# Patient Record
Sex: MALE | Race: WHITE | NOT HISPANIC OR LATINO | Employment: FULL TIME | URBAN - METROPOLITAN AREA
[De-identification: names, ages, dates, MRNs, and addresses within clinical notes are randomized per-mention and may not be internally consistent; named-entity substitution may affect disease eponyms.]

---

## 2017-10-03 ENCOUNTER — GENERIC CONVERSION - ENCOUNTER (OUTPATIENT)
Dept: OTHER | Facility: OTHER | Age: 47
End: 2017-10-03

## 2017-11-09 ENCOUNTER — GENERIC CONVERSION - ENCOUNTER (OUTPATIENT)
Dept: OTHER | Facility: OTHER | Age: 47
End: 2017-11-09

## 2017-11-09 LAB
A/G RATIO (HISTORICAL): 2.1 (ref 1.2–2.2)
ALBUMIN SERPL BCP-MCNC: 4.5 G/DL (ref 3.5–5.5)
ALP SERPL-CCNC: 51 IU/L (ref 39–117)
ALT SERPL W P-5'-P-CCNC: 19 IU/L (ref 0–44)
AST SERPL W P-5'-P-CCNC: 20 IU/L (ref 0–40)
BILIRUB SERPL-MCNC: 0.6 MG/DL (ref 0–1.2)
BUN SERPL-MCNC: 14 MG/DL (ref 6–24)
BUN/CREA RATIO (HISTORICAL): 13 (ref 9–20)
CALCIUM SERPL-MCNC: 9.2 MG/DL (ref 8.7–10.2)
CHLORIDE SERPL-SCNC: 101 MMOL/L (ref 96–106)
CHOLEST SERPL-MCNC: 181 MG/DL (ref 100–199)
CO2 SERPL-SCNC: 27 MMOL/L (ref 18–29)
CREAT SERPL-MCNC: 1.07 MG/DL (ref 0.76–1.27)
EGFR AFRICAN AMERICAN (HISTORICAL): 96 ML/MIN/1.73
EGFR-AMERICAN CALC (HISTORICAL): 83 ML/MIN/1.73
GLUCOSE SERPL-MCNC: 81 MG/DL (ref 65–99)
HDLC SERPL-MCNC: 46 MG/DL
INTERPRETATION (HISTORICAL): NORMAL
LDLC SERPL CALC-MCNC: 105 MG/DL (ref 0–99)
POTASSIUM SERPL-SCNC: 3.9 MMOL/L (ref 3.5–5.2)
PROSTATE SPECIFIC ANTIGEN (HISTORICAL): 0.9 NG/ML (ref 0–4)
SODIUM SERPL-SCNC: 141 MMOL/L (ref 134–144)
TOT. GLOBULIN, SERUM (HISTORICAL): 2.1 G/DL (ref 1.5–4.5)
TOTAL PROTEIN (HISTORICAL): 6.6 G/DL (ref 6–8.5)
TRIGL SERPL-MCNC: 152 MG/DL (ref 0–149)

## 2017-11-10 ENCOUNTER — GENERIC CONVERSION - ENCOUNTER (OUTPATIENT)
Dept: OTHER | Facility: OTHER | Age: 47
End: 2017-11-10

## 2017-11-20 ENCOUNTER — ALLSCRIPTS OFFICE VISIT (OUTPATIENT)
Dept: OTHER | Facility: OTHER | Age: 47
End: 2017-11-20

## 2018-01-10 NOTE — RESULT NOTES
Verified Results  (1) LIPID PANEL FASTING W DIRECT LDL REFLEX [id number] 07:37AM Ron Proctor     Test Name Result Flag Reference   Cholesterol, Total 181 mg/dL  100-199   Triglycerides 152 mg/dL H 0-149   HDL Cholesterol 46 mg/dL  >39   LDL Cholesterol Calc 105 mg/dL H 0-99

## 2018-01-11 NOTE — RESULT NOTES
Verified Results  (1) COMPREHENSIVE METABOLIC PANEL [id number] 18:80YQ Breezy Luciano     Test Name Result Flag Reference   Glucose, Serum 81 mg/dL  65-99   BUN 14 mg/dL  6-24   Creatinine, Serum 1 07 mg/dL  0 76-1 27   BUN/Creatinine Ratio 13  9-20   Sodium, Serum 141 mmol/L  134-144   Potassium, Serum 3 9 mmol/L  3 5-5 2   Chloride, Serum 101 mmol/L     Carbon Dioxide, Total 27 mmol/L  18-29   Calcium, Serum 9 2 mg/dL  8 7-10 2   Protein, Total, Serum 6 6 g/dL  6 0-8 5   Albumin, Serum 4 5 g/dL  3 5-5 5   Globulin, Total 2 1 g/dL  1 5-4 5   A/G Ratio 2 1  1 2-2 2   Bilirubin, Total 0 6 mg/dL  0 0-1 2   Alkaline Phosphatase, S 51 IU/L     AST (SGOT) 20 IU/L  0-40   ALT (SGPT) 19 IU/L  0-44   eGFR If NonAfricn Am 83 mL/min/1 73  >59   eGFR If Africn Am 96 mL/min/1 73  >59

## 2018-01-12 NOTE — PROGRESS NOTES
Assessment    1  BMI 28 0-28 9,adult (V85 24) (Z68 28)   2  Overweight (278 02) (E66 3)   3  Encounter for preventive health examination (V70 0) (Z00 00)    Discussion/Summary  The patient, patient's family was counseled regarding risk factor reductions, impressions  Chief Complaint  Seen for a CPE  er/cma  History of Present Illness  HM, Adult Male: The patient is being seen for a health maintenance evaluation  General Health: The patient's health since the last visit is described as good  Immunizations status: up to date  Lifestyle:  He consumes a diverse and healthy diet  He does not have any weight concerns  He does not use tobacco    Screening:      Review of Systems    Cardiovascular: no chest pain  Respiratory: no shortness of breath  Active Problems    1  BMI 28 0-28 9,adult (V85 24) (Z68 28)   2  Colon cancer screening (V76 51) (Z12 11)   3  Immunization due (V05 9) (Z23)   4  Overweight (278 02) (E66 3)   5  Prostate cancer screening (V76 44) (Z12 5)   6  Screening for cardiovascular, respiratory, and genitourinary diseases   (V81 2,V81 4,V81 6) (Z13 6,Z13 83,Z13 89)   7  Screening for diabetes mellitus (DM) (V77 1) (Z13 1)   8  Well adult on routine health check (V70 0) (Z00 00)    Past Medical History    · History of Acute maxillary sinusitis (461 0) (J01 00)    Family History  Mother    · Family history of Hypertension  Father    · Family history of Enlarged prostate (600 00) (N40 0)   · Family history of cardiac pacemaker (V17 49) (Z80 80)  Paternal Grandmother    · Family history of Diabetes (250 00) (E11 9)    Social History    · Being A Social Drinker   · Former smoker (A05 18) (Q08 762)    Current Meds   1  [employer] Vitamin C TABS; Take 1 tablet daily Recorded   2  [employer]S Vitamin D TABS; qd Recorded   3  Daily Combo Multi Vitamins Oral Tablet; Take 1 tablet daily Recorded    Allergies    1   Penicillins    Vitals   Recorded: [id number] 10:42AM   Temperature 96 6 F   Heart Rate 76   Respiration 20   Systolic 189   Diastolic 74   Height 5 ft 9 in   Weight 193 lb    BMI Calculated 28 5   BSA Calculated 2 03     Physical Exam    Constitutional   General appearance: No acute distress, well appearing and well nourished  Eyes   Conjunctiva and lids: No erythema, swelling or discharge  Pupils and irises: Equal, round, reactive to light  Ophthalmoscopic examination: Normal fundi and optic discs  Ears, Nose, Mouth, and Throat   External inspection of ears and nose: Normal     Otoscopic examination: Tympanic membranes translucent with normal light reflex  Canals patent without erythema  some wax  Nasal mucosa, septum, and turbinates: Normal without edema or erythema  Lips, teeth, and gums: Normal, good dentition  Oropharynx: Normal with no erythema, edema, exudate or lesions  Neck   Neck: Supple, symmetric, trachea midline, no masses  Pulmonary   Respiratory effort: No increased work of breathing or signs of respiratory distress  Auscultation of lungs: Clear to auscultation  Cardiovascular   Auscultation of heart: Normal rate and rhythm, normal S1 and S2, no murmurs  Carotid pulses: 2+ bilaterally  Pedal pulses: 2+ bilaterally  Examination of extremities for edema and/or varicosities: Normal     Abdomen   Abdomen: Non-tender, no masses  Liver and spleen: No hepatomegaly or splenomegaly  Examination for hernias: No hernias appreciated  Genitourinary   Scrotal contents: Normal testes, no masses  Penis: Normal, no lesions  Digital rectal exam of prostate: Normal size, no masses  Lymphatic   Palpation of lymph nodes in neck: No lymphadenopathy  Palpation of lymph nodes in groin: No lymphadenopathy  Musculoskeletal   Gait and station: Normal     Inspection/palpation of digits and nails: Normal without clubbing or cyanosis      Inspection/palpation of joints, bones, and muscles: Normal     Range of motion: Normal     Stability: Normal  Skin   Skin and subcutaneous tissue: Normal without rashes or lesions  Palpation of skin and subcutaneous tissue: Normal turgor  Neurologic   Reflexes: 2+ and symmetric  Sensation: No sensory loss  Psychiatric   Judgment and insight: Normal     Mood and affect: Normal        Procedure    Procedure: Visual Acuity Test    Indication: routine screening  Inforrmation supplied by a Snellen chart     Results: 20/20 in both eyes without corrective device, 20/30 in the right eye without corrective device, 20/20 in the left eye without corrective device      Signatures   Electronically signed by : Jenn Torrez DO; Nov 20 2017 11:55AM EST                       (Author)

## 2018-01-13 VITALS
WEIGHT: 193 LBS | HEART RATE: 76 BPM | DIASTOLIC BLOOD PRESSURE: 74 MMHG | RESPIRATION RATE: 20 BRPM | HEIGHT: 69 IN | SYSTOLIC BLOOD PRESSURE: 110 MMHG | BODY MASS INDEX: 28.58 KG/M2 | TEMPERATURE: 96.6 F

## 2018-01-13 NOTE — PROGRESS NOTES
Assessment    1  Well adult on routine health check (V70 0) (Z00 00)   2  BMI 28 0-28 9,adult (V85 24) (Z68 28)   3  Overweight (278 02) (E66 3)    Discussion/Summary  Impression: health maintenance visit  Advice and education were given regarding nutrition, weight bearing exercise, weight loss and sunscreen use  Chief Complaint  CPE      History of Present Illness  HM, Adult Male: The patient is being seen for a health maintenance evaluation  General Health: The patient's health since the last visit is described as good  Immunizations status: up to date  Lifestyle:  He consumes a diverse and healthy diet  He has weight concerns  He does not use tobacco    Screening:      Review of Systems    Cardiovascular: no chest pain  Respiratory: no shortness of breath  Active Problems    1  Colon cancer screening (V76 51) (Z12 11)   2  Encounter for screening for cardiovascular disorders (V81 2) (Z13 6)   3  Immunization due (V05 9) (Z23)   4  Prostate cancer screening (V76 44) (Z12 5)   5  Screening for diabetes mellitus (DM) (V77 1) (Z13 1)   6  Well adult on routine health check (V70 0) (Z00 00)    Past Medical History    · History of Acute maxillary sinusitis (461 0) (J01 00)    Family History  Mother    · Family history of Hypertension  Father    · Family history of Enlarged prostate (600 00) (N40 0)   · Family history of cardiac pacemaker (V17 49) (Z80 80)  Paternal Grandmother    · Family history of Diabetes (250 00) (E11 9)    Social History    · Being A Social Drinker   · Former smoker (P00 05) (C57 510)    Current Meds   1  [employer] Vitamin C TABS; Take 1 tablet daily Recorded   2  CVS Vitamin D TABS; qd Recorded   3  Daily Combo Multi Vitamins Oral Tablet; Take 1 tablet daily Recorded    Allergies    1   Penicillins    Vitals   Recorded: [id number] 67:66CW   Systolic 289   Diastolic 72   Heart Rate 68   Respiration 16   Temperature 97 9 F   Height 5 ft 9 in   Weight 195 lb    BMI Calculated 28 8 BSA Calculated 2 04     Physical Exam    Constitutional   General appearance: No acute distress, well appearing and well nourished  Eyes   Conjunctiva and lids: No erythema, swelling or discharge  Pupils and irises: Equal, round, reactive to light  Ears, Nose, Mouth, and Throat   External inspection of ears and nose: Normal     Otoscopic examination: Tympanic membranes translucent with normal light reflex  Canals patent without erythema  Nasal mucosa, septum, and turbinates: Normal without edema or erythema  Lips, teeth, and gums: Normal, good dentition  Oropharynx: Normal with no erythema, edema, exudate or lesions  Neck   Neck: Supple, symmetric, trachea midline, no masses  Pulmonary   Respiratory effort: No increased work of breathing or signs of respiratory distress  Auscultation of lungs: Clear to auscultation  Cardiovascular   Palpation of heart: Normal PMI, no thrills  Auscultation of heart: Normal rate and rhythm, normal S1 and S2, no murmurs  Pedal pulses: 2+ bilaterally  Examination of extremities for edema and/or varicosities: Normal     Abdomen   Abdomen: Non-tender, no masses  Liver and spleen: No hepatomegaly or splenomegaly  Examination for hernias: No hernias appreciated  Genitourinary   Scrotal contents: Normal testes, no masses  Penis: Normal, no lesions  Digital rectal exam of prostate: Normal size, no masses  Lymphatic   Palpation of lymph nodes in neck: No lymphadenopathy  Palpation of lymph nodes in groin: No lymphadenopathy  Musculoskeletal   Gait and station: Normal     Inspection/palpation of digits and nails: Normal without clubbing or cyanosis  Inspection/palpation of joints, bones, and muscles: Normal     Range of motion: Normal     Stability: Normal     Muscle strength/tone: Normal     Skin   Skin and subcutaneous tissue: Normal without rashes or lesions  Palpation of skin and subcutaneous tissue: Normal turgor  Neurologic   Reflexes: 2+ and symmetric  Sensation: No sensory loss  Psychiatric   Judgment and insight: Normal     Mood and affect: Normal        Results/Data  PHQ-2 Adult Depression Screening [id number] 10:48PM Logan Vaca     Test Name Result Flag Reference   PHQ-2 Adult Depression Score 0     Over the last two weeks, how often have you been bothered by any of the following problems?   Little interest or pleasure in doing things: Not at all - 0  Feeling down, depressed, or hopeless: Not at all - 0   PHQ-2 Adult Depression Screening Negative         Signatures   Electronically signed by : Cris Sarah DO; Nov 14 2016 10:50PM EST                       (Author)

## 2018-10-02 DIAGNOSIS — Z13.89 SCREENING FOR CARDIOVASCULAR, RESPIRATORY, AND GENITOURINARY DISEASES: Primary | ICD-10-CM

## 2018-10-02 DIAGNOSIS — Z13.6 SCREENING FOR CARDIOVASCULAR, RESPIRATORY, AND GENITOURINARY DISEASES: Primary | ICD-10-CM

## 2018-10-02 DIAGNOSIS — Z12.5 PROSTATE CANCER SCREENING: ICD-10-CM

## 2018-10-02 DIAGNOSIS — Z13.1 SCREENING FOR DIABETES MELLITUS (DM): ICD-10-CM

## 2018-10-02 DIAGNOSIS — Z13.83 SCREENING FOR CARDIOVASCULAR, RESPIRATORY, AND GENITOURINARY DISEASES: Primary | ICD-10-CM

## 2018-11-10 LAB
ALBUMIN SERPL-MCNC: 4.8 G/DL (ref 3.5–5.5)
ALBUMIN/GLOB SERPL: 2.2 {RATIO} (ref 1.2–2.2)
ALP SERPL-CCNC: 52 IU/L (ref 39–117)
ALT SERPL-CCNC: 19 IU/L (ref 0–44)
AST SERPL-CCNC: 25 IU/L (ref 0–40)
BILIRUB SERPL-MCNC: 0.8 MG/DL (ref 0–1.2)
BUN SERPL-MCNC: 16 MG/DL (ref 6–24)
BUN/CREAT SERPL: 16 (ref 9–20)
CALCIUM SERPL-MCNC: 9.2 MG/DL (ref 8.7–10.2)
CHLORIDE SERPL-SCNC: 102 MMOL/L (ref 96–106)
CHOLEST SERPL-MCNC: 175 MG/DL (ref 100–199)
CO2 SERPL-SCNC: 24 MMOL/L (ref 20–29)
CREAT SERPL-MCNC: 1.01 MG/DL (ref 0.76–1.27)
GLOBULIN SER-MCNC: 2.2 G/DL (ref 1.5–4.5)
GLUCOSE SERPL-MCNC: 87 MG/DL (ref 65–99)
HDLC SERPL-MCNC: 47 MG/DL
LABCORP COMMENT: NORMAL
LDLC SERPL CALC-MCNC: 101 MG/DL (ref 0–99)
MICRODELETION SYND BLD/T FISH: NORMAL
POTASSIUM SERPL-SCNC: 4.1 MMOL/L (ref 3.5–5.2)
PROT SERPL-MCNC: 7 G/DL (ref 6–8.5)
PSA SERPL-MCNC: 0.9 NG/ML (ref 0–4)
SL AMB EGFR AFRICAN AMERICAN: 102 ML/MIN/1.73
SL AMB EGFR NON AFRICAN AMERICAN: 88 ML/MIN/1.73
SODIUM SERPL-SCNC: 140 MMOL/L (ref 134–144)
TRIGL SERPL-MCNC: 136 MG/DL (ref 0–149)

## 2018-11-18 RX ORDER — VITAMIN B COMPLEX
1 TABLET ORAL DAILY
COMMUNITY

## 2018-11-19 ENCOUNTER — OFFICE VISIT (OUTPATIENT)
Dept: FAMILY MEDICINE CLINIC | Facility: CLINIC | Age: 48
End: 2018-11-19
Payer: COMMERCIAL

## 2018-11-19 VITALS
BODY MASS INDEX: 28.55 KG/M2 | TEMPERATURE: 96.6 F | RESPIRATION RATE: 16 BRPM | HEART RATE: 60 BPM | WEIGHT: 188.4 LBS | SYSTOLIC BLOOD PRESSURE: 112 MMHG | HEIGHT: 68 IN | DIASTOLIC BLOOD PRESSURE: 80 MMHG

## 2018-11-19 DIAGNOSIS — Z00.00 HEALTHCARE MAINTENANCE: Primary | ICD-10-CM

## 2018-11-19 DIAGNOSIS — E66.3 OVERWEIGHT: ICD-10-CM

## 2018-11-19 PROCEDURE — 99396 PREV VISIT EST AGE 40-64: CPT | Performed by: FAMILY MEDICINE

## 2018-11-19 RX ORDER — MULTIVIT WITH MINERALS/LUTEIN
1000 TABLET ORAL DAILY
COMMUNITY

## 2018-11-19 NOTE — PROGRESS NOTES
Assessment/Plan:    No problem-specific Assessment & Plan notes found for this encounter  cpe done  Labs reviewed  bmi advised     Diagnoses and all orders for this visit:    Healthcare maintenance    Overweight    Other orders  -     Cholecalciferol ([employer]S VITAMIN D PO); Take by mouth daily  -     Multiple Vitamins-Minerals (DAILY COMBO MULTI VITAMINS) TABS; Take 1 tablet by mouth daily  -     Ascorbic Acid (VITAMIN C) 1000 MG tablet; Take 1,000 mg by mouth daily              No Follow-up on file  Subjective:      Patient ID: Amita Benítez is a 53 y o  male  Chief Complaint   Patient presents with    Physical Exam     prcma       HPI  Eats healthy  Gym 3x/w  Labs reviewed    The following portions of the patient's history were reviewed and updated as appropriate: allergies, current medications, past family history, past medical history, past social history, past surgical history and problem list     Review of Systems   Respiratory: Negative for shortness of breath  Cardiovascular: Negative for chest pain  Gastrointestinal: Negative for abdominal pain  Current Outpatient Prescriptions   Medication Sig Dispense Refill    Ascorbic Acid (VITAMIN C) 1000 MG tablet Take 1,000 mg by mouth daily      Cholecalciferol (CVS VITAMIN D PO) Take by mouth daily      Multiple Vitamins-Minerals (DAILY COMBO MULTI VITAMINS) TABS Take 1 tablet by mouth daily       No current facility-administered medications for this visit  Objective:    /80   Pulse 60   Temp (!) 96 6 °F (35 9 °C)   Resp 16   Ht 5' 7 75" (1 721 m)   Wt 85 5 kg (188 lb 6 4 oz)   BMI 28 86 kg/m²        Physical Exam   Constitutional: He appears well-developed  No distress  HENT:   Head: Normocephalic  Mouth/Throat: No oropharyngeal exudate  Eyes: Conjunctivae are normal  No scleral icterus  Neck: Neck supple  No thyromegaly present  Cardiovascular: Normal rate and intact distal pulses      No murmur heard   Pulmonary/Chest: Effort normal  No respiratory distress  He has no wheezes  Abdominal: Soft  He exhibits no mass  There is no tenderness  There is no rebound and no guarding  Genitourinary: Prostate normal and penis normal  No penile tenderness  Musculoskeletal: He exhibits no edema or deformity  Lymphadenopathy:     He has no cervical adenopathy  Neurological: He is alert  He exhibits normal muscle tone  Skin: Skin is warm and dry  No rash noted  No pallor  Psychiatric: His behavior is normal  Thought content normal    Nursing note and vitals reviewed               Yetta Comings, DO

## 2018-12-12 ENCOUNTER — TELEPHONE (OUTPATIENT)
Dept: FAMILY MEDICINE CLINIC | Facility: CLINIC | Age: 48
End: 2018-12-12

## 2018-12-12 NOTE — TELEPHONE ENCOUNTER
I used the usual screening code that I always use for lipid screening:    Screening for cardiovascular, respiratory, and genitourinary diseases [Z13 6, Z13 89, Z13 83]     I checked that this was used on the lab slip I gave him      Can we get any information from Jesus Ariza?

## 2018-12-12 NOTE — TELEPHONE ENCOUNTER
DR German Lino    Patient called stating he received a bill from [facility]   They are not covering his lipid panel because of a number being wrong on the preventative code  They would not tell him what code was wrong  They want Dr to change the code, resend it to [facility]   Please advise

## 2018-12-13 NOTE — TELEPHONE ENCOUNTER
Pt called back  States the insurance says it was coded incorrectly  However he did not have a code to give us   Stated his wife did not get billed but he did, can we resubmit with the same code that was on his wifes order  [facility] wilfredo

## 2018-12-13 NOTE — TELEPHONE ENCOUNTER
Tried calling [facility] billing but they closed at 6pm  Can someone call them back tomorrow to try to find out what the problem is ?  Thea Mccann MA

## 2018-12-13 NOTE — TELEPHONE ENCOUNTER
Spoke with savannah, pts bill is $29 57, savannah stated that is his insurance deductible, everything was covered, what is left is the patients responsibility    LMOM for pt to call the office back  [facility] wilfredo

## 2018-12-14 NOTE — TELEPHONE ENCOUNTER
Please let him know that I used the exact same diagnosis code on his cholesterol level that I did on his wife Isabel Ocampo  There is nothing for me to change

## 2018-12-14 NOTE — TELEPHONE ENCOUNTER
LMOM ok per pt  Detailed message left  that Z00 00 code for ([address]) was added to Pt LABS  [facility] stated that new information was submitted to health insurance  Pt can disregard his bill until it is completed  Pt was questioning  $ [phone] on his lipid panel  Per Joshua Cullen there is nothing else that we can do on our end     Danelle Guevara, MA

## 2019-01-04 ENCOUNTER — TELEPHONE (OUTPATIENT)
Dept: FAMILY MEDICINE CLINIC | Facility: CLINIC | Age: 49
End: 2019-01-04

## 2019-01-04 NOTE — TELEPHONE ENCOUNTER
Patient came in and dropped off paperwork to be filled out by Dr Robert Jenkins  Please call patient when paperwork is complete  At nurses station

## 2019-08-13 ENCOUNTER — TELEPHONE (OUTPATIENT)
Dept: FAMILY MEDICINE CLINIC | Facility: CLINIC | Age: 49
End: 2019-08-13

## 2019-08-13 DIAGNOSIS — Z13.89 SCREENING FOR CARDIOVASCULAR, RESPIRATORY, AND GENITOURINARY DISEASES: Primary | ICD-10-CM

## 2019-08-13 DIAGNOSIS — Z13.1 SCREENING FOR DIABETES MELLITUS (DM): ICD-10-CM

## 2019-08-13 DIAGNOSIS — Z13.6 SCREENING FOR CARDIOVASCULAR, RESPIRATORY, AND GENITOURINARY DISEASES: Primary | ICD-10-CM

## 2019-08-13 DIAGNOSIS — Z00.00 HEALTHCARE MAINTENANCE: ICD-10-CM

## 2019-08-13 DIAGNOSIS — Z12.5 PROSTATE CANCER SCREENING: ICD-10-CM

## 2019-08-13 DIAGNOSIS — Z13.83 SCREENING FOR CARDIOVASCULAR, RESPIRATORY, AND GENITOURINARY DISEASES: Primary | ICD-10-CM

## 2019-08-13 NOTE — TELEPHONE ENCOUNTER
Patients wife is requesting a blood work order for him before their appt in November      Per her [employer] Message she is requesting "CMP,LIPID and Uric Acid"    Please put in the mail with her blood work

## 2019-11-12 LAB
ALBUMIN SERPL-MCNC: 4.7 G/DL (ref 3.5–5.5)
ALBUMIN/GLOB SERPL: 1.8 {RATIO} (ref 1.2–2.2)
ALP SERPL-CCNC: 68 IU/L (ref 39–117)
ALT SERPL-CCNC: 19 IU/L (ref 0–44)
AST SERPL-CCNC: 23 IU/L (ref 0–40)
BILIRUB SERPL-MCNC: 0.5 MG/DL (ref 0–1.2)
BUN SERPL-MCNC: 14 MG/DL (ref 6–24)
BUN/CREAT SERPL: 15 (ref 9–20)
CALCIUM SERPL-MCNC: 9.3 MG/DL (ref 8.7–10.2)
CHLORIDE SERPL-SCNC: 101 MMOL/L (ref 96–106)
CHOLEST SERPL-MCNC: 165 MG/DL (ref 100–199)
CO2 SERPL-SCNC: 26 MMOL/L (ref 20–29)
CREAT SERPL-MCNC: 0.93 MG/DL (ref 0.76–1.27)
GLOBULIN SER-MCNC: 2.6 G/DL (ref 1.5–4.5)
GLUCOSE SERPL-MCNC: 86 MG/DL (ref 65–99)
HDLC SERPL-MCNC: 48 MG/DL
LDLC SERPL CALC-MCNC: 99 MG/DL (ref 0–99)
MICRODELETION SYND BLD/T FISH: NORMAL
POTASSIUM SERPL-SCNC: 3.9 MMOL/L (ref 3.5–5.2)
PROT SERPL-MCNC: 7.3 G/DL (ref 6–8.5)
PSA SERPL-MCNC: 1.2 NG/ML (ref 0–4)
SL AMB EGFR AFRICAN AMERICAN: 112 ML/MIN/1.73
SL AMB EGFR NON AFRICAN AMERICAN: 97 ML/MIN/1.73
SODIUM SERPL-SCNC: 142 MMOL/L (ref 134–144)
TRIGL SERPL-MCNC: 92 MG/DL (ref 0–149)
URATE SERPL-MCNC: 7.1 MG/DL (ref 3.7–8.6)

## 2019-11-18 ENCOUNTER — OFFICE VISIT (OUTPATIENT)
Dept: FAMILY MEDICINE CLINIC | Facility: CLINIC | Age: 49
End: 2019-11-18
Payer: COMMERCIAL

## 2019-11-18 VITALS
HEART RATE: 60 BPM | SYSTOLIC BLOOD PRESSURE: 112 MMHG | TEMPERATURE: 96.7 F | WEIGHT: 182.8 LBS | DIASTOLIC BLOOD PRESSURE: 72 MMHG | RESPIRATION RATE: 16 BRPM | HEIGHT: 68 IN | BODY MASS INDEX: 27.71 KG/M2

## 2019-11-18 DIAGNOSIS — Z00.00 HEALTHCARE MAINTENANCE: Primary | ICD-10-CM

## 2019-11-18 PROCEDURE — 99396 PREV VISIT EST AGE 40-64: CPT | Performed by: FAMILY MEDICINE

## 2019-11-18 NOTE — PROGRESS NOTES
Assessment/Plan:    No problem-specific Assessment & Plan notes found for this encounter  Labs reviewed  Cpe done    bmi in overweight range  Cont diet efforts  Biometric form pending from [employer]    Suggest podiatry referral when ready     Diagnoses and all orders for this visit:    Healthcare maintenance        Return if symptoms worsen or fail to improve  Subjective:      Patient ID: Mauri Pedro is a 51 y o  male  Chief Complaint   Patient presents with    Physical Exam     prcma       HPI  Right foot pain lately  No redness or swelling  Labs reviewed  psa normal  Walks a lot  Eats mostly healthy  1-2x/w cheat diet days  Declines flu shot  Thinking about Dr Kyler Bailey since recurrent, even needed crutches one time  No overuse or injury    Some wt loss since last year  bmi advised  Labs reviewed    BMI Counseling: Body mass index is 28 kg/m²  Discussed the patient's BMI with him  The BMI is above normal  Nutrition recommendations include decreasing overall calorie intake  The following portions of the patient's history were reviewed and updated as appropriate: allergies, current medications, past family history, past medical history, past social history, past surgical history and problem list     Review of Systems   Musculoskeletal: Positive for arthralgias  Current Outpatient Medications   Medication Sig Dispense Refill    Ascorbic Acid (VITAMIN C) 1000 MG tablet Take 1,000 mg by mouth daily      Cholecalciferol ([employer]S VITAMIN D PO) Take by mouth daily      Multiple Vitamins-Minerals (DAILY COMBO MULTI VITAMINS) TABS Take 1 tablet by mouth daily       No current facility-administered medications for this visit  Objective:    /72   Pulse 60   Temp (!) 96 7 °F (35 9 °C)   Resp 16   Ht 5' 7 75" (1 721 m)   Wt 82 9 kg (182 lb 12 8 oz)   BMI 28 00 kg/m²        Physical Exam   Constitutional: He appears well-developed  No distress  HENT:   Head: Normocephalic     Right Ear: External ear normal    Left Ear: External ear normal    Eyes: Conjunctivae are normal  No scleral icterus  Neck: Neck supple  Cardiovascular: Normal rate, regular rhythm and intact distal pulses  No murmur heard  Pulmonary/Chest: Effort normal  No respiratory distress  Abdominal: Soft  Bowel sounds are normal  He exhibits no mass  There is no tenderness  There is no guarding  No hernia  Genitourinary: Rectum normal, prostate normal and penis normal    Musculoskeletal: He exhibits no edema or deformity  Neurological: He is alert  He exhibits normal muscle tone  Skin: Skin is warm and dry  No pallor  Psychiatric: His behavior is normal  Thought content normal    Nursing note and vitals reviewed               Milton Camacho DO

## 2020-01-02 ENCOUNTER — TELEPHONE (OUTPATIENT)
Dept: FAMILY MEDICINE CLINIC | Facility: CLINIC | Age: 50
End: 2020-01-02

## 2020-01-02 NOTE — TELEPHONE ENCOUNTER
Pt dropped off form for Biometric[employer], please call when done and ready for pick  Up   Form is in Nurse Pending 1

## 2020-01-08 ENCOUNTER — TELEPHONE (OUTPATIENT)
Dept: FAMILY MEDICINE CLINIC | Facility: CLINIC | Age: 50
End: 2020-01-08

## 2020-01-08 DIAGNOSIS — L02.439 CARBUNCLE OF AXILLA: Primary | ICD-10-CM

## 2020-01-08 RX ORDER — DOXYCYCLINE 100 MG/1
100 CAPSULE ORAL 2 TIMES DAILY
Qty: 20 CAPSULE | Refills: 0 | Status: SHIPPED | OUTPATIENT
Start: 2020-01-08 | End: 2020-01-18

## 2020-01-08 NOTE — TELEPHONE ENCOUNTER
S/w pt  Pus from lump in armpit few days  Using warm compresses  Offered appt tonight but can't come in  Willing to start abx and then make appt in few days in case needs I and D

## 2020-01-10 ENCOUNTER — OFFICE VISIT (OUTPATIENT)
Dept: FAMILY MEDICINE CLINIC | Facility: CLINIC | Age: 50
End: 2020-01-10
Payer: COMMERCIAL

## 2020-01-10 VITALS
HEIGHT: 68 IN | DIASTOLIC BLOOD PRESSURE: 82 MMHG | BODY MASS INDEX: 28.04 KG/M2 | OXYGEN SATURATION: 100 % | SYSTOLIC BLOOD PRESSURE: 136 MMHG | TEMPERATURE: 96.7 F | WEIGHT: 185 LBS | RESPIRATION RATE: 18 BRPM | HEART RATE: 68 BPM

## 2020-01-10 DIAGNOSIS — L02.439 CARBUNCLE OF AXILLA: Primary | ICD-10-CM

## 2020-01-10 PROCEDURE — 3008F BODY MASS INDEX DOCD: CPT | Performed by: FAMILY MEDICINE

## 2020-01-10 PROCEDURE — 1036F TOBACCO NON-USER: CPT | Performed by: FAMILY MEDICINE

## 2020-01-10 PROCEDURE — 99213 OFFICE O/P EST LOW 20 MIN: CPT | Performed by: FAMILY MEDICINE

## 2020-01-10 RX ORDER — TURMERIC 400 MG
CAPSULE ORAL
COMMUNITY

## 2020-01-10 NOTE — PROGRESS NOTES
Assessment/Plan:    No problem-specific Assessment & Plan notes found for this encounter  C/s of wound taken but pt declined having it sent at this time, understands purpose and use but concerned about insurance coverage, he is aware he will need re-eval and further testing if does not continue to improve  F/u if no better  Surgeon option if recurrent or failure to improve (hidradenitis suppurativa), since this is his second episode       Diagnoses and all orders for this visit:    Carbuncle of axilla    Other orders  -     Turmeric 400 MG CAPS; Take by mouth       Return if symptoms worsen or fail to improve  Subjective:      Patient ID: Nataliya Rojas is a 51 y o  male  Chief Complaint   Patient presents with    Follow-up     [facility] lpn       HPI  Uses [facility]  Improving somewhat  Less tender with reaching overhead  Some pus still  No bleeding  No f/c  No arm swelling  Had once in past    The following portions of the patient's history were reviewed and updated as appropriate: allergies, current medications, past family history, past medical history, past social history, past surgical history and problem list     Review of Systems   Constitutional: Negative for chills and fever  Current Outpatient Medications   Medication Sig Dispense Refill    Ascorbic Acid (VITAMIN C) 1000 MG tablet Take 1,000 mg by mouth daily      Cholecalciferol ([employer]S VITAMIN D PO) Take by mouth daily      doxycycline monohydrate (MONODOX) 100 mg capsule Take 1 capsule (100 mg total) by mouth 2 (two) times a day for 10 days 20 capsule 0    Multiple Vitamins-Minerals (DAILY COMBO MULTI VITAMINS) TABS Take 1 tablet by mouth daily      Turmeric 400 MG CAPS Take by mouth       No current facility-administered medications for this visit          Objective:    /82   Pulse 68   Temp (!) 96 7 °F (35 9 °C)   Resp 18   Ht 5' 7 75" (1 721 m)   Wt 83 9 kg (185 lb)   SpO2 100%   BMI 28 34 kg/m²        Physical Exam Constitutional: He appears well-developed  No distress  HENT:   Head: Normocephalic  Eyes: Conjunctivae are normal  No scleral icterus  Neck: Neck supple  Cardiovascular: Normal rate, regular rhythm and intact distal pulses  Pulmonary/Chest: Effort normal and breath sounds normal  No respiratory distress  Abdominal: Soft  He exhibits no distension  Musculoskeletal: He exhibits no edema or deformity  Neurological: He is alert  Skin: Skin is warm and dry  No pallor  Left axillary carbuncle with some expressible pus, yellow in color, local induration, soft center, declines I and D in office   Psychiatric: His behavior is normal  Thought content normal    Nursing note and vitals reviewed  BMI Counseling: Body mass index is 28 34 kg/m²  The BMI is above normal  Nutrition recommendations include moderation in carbohydrate intake  No pharmacotherapy was ordered                 Isauro Barton DO

## 2021-03-16 ENCOUNTER — TELEPHONE (OUTPATIENT)
Dept: FAMILY MEDICINE CLINIC | Facility: CLINIC | Age: 51
End: 2021-03-16

## 2021-03-17 DIAGNOSIS — Z13.83 SCREENING FOR CARDIOVASCULAR, RESPIRATORY, AND GENITOURINARY DISEASES: Primary | ICD-10-CM

## 2021-03-17 DIAGNOSIS — Z13.1 SCREENING FOR DIABETES MELLITUS (DM): ICD-10-CM

## 2021-03-17 DIAGNOSIS — Z12.5 PROSTATE CANCER SCREENING: ICD-10-CM

## 2021-03-17 DIAGNOSIS — Z13.89 SCREENING FOR CARDIOVASCULAR, RESPIRATORY, AND GENITOURINARY DISEASES: Primary | ICD-10-CM

## 2021-03-17 DIAGNOSIS — Z13.6 SCREENING FOR CARDIOVASCULAR, RESPIRATORY, AND GENITOURINARY DISEASES: Primary | ICD-10-CM

## 2021-07-12 LAB
ALBUMIN SERPL-MCNC: 4.8 G/DL (ref 4–5)
ALBUMIN/GLOB SERPL: 2 {RATIO} (ref 1.2–2.2)
ALP SERPL-CCNC: 54 IU/L (ref 48–121)
ALT SERPL-CCNC: 26 IU/L (ref 0–44)
AST SERPL-CCNC: 30 IU/L (ref 0–40)
BILIRUB SERPL-MCNC: 0.4 MG/DL (ref 0–1.2)
BUN SERPL-MCNC: 19 MG/DL (ref 6–24)
BUN/CREAT SERPL: 18 (ref 9–20)
CALCIUM SERPL-MCNC: 9.7 MG/DL (ref 8.7–10.2)
CHLORIDE SERPL-SCNC: 105 MMOL/L (ref 96–106)
CHOLEST SERPL-MCNC: 181 MG/DL (ref 100–199)
CO2 SERPL-SCNC: 26 MMOL/L (ref 20–29)
CREAT SERPL-MCNC: 1.07 MG/DL (ref 0.76–1.27)
GLOBULIN SER-MCNC: 2.4 G/DL (ref 1.5–4.5)
GLUCOSE SERPL-MCNC: 84 MG/DL (ref 65–99)
HDLC SERPL-MCNC: 44 MG/DL
LDLC SERPL CALC-MCNC: 114 MG/DL (ref 0–99)
MICRODELETION SYND BLD/T FISH: NORMAL
POTASSIUM SERPL-SCNC: 4.6 MMOL/L (ref 3.5–5.2)
PROT SERPL-MCNC: 7.2 G/DL (ref 6–8.5)
PSA SERPL-MCNC: 1.3 NG/ML (ref 0–4)
SL AMB EGFR AFRICAN AMERICAN: 93 ML/MIN/1.73
SL AMB EGFR NON AFRICAN AMERICAN: 81 ML/MIN/1.73
SODIUM SERPL-SCNC: 142 MMOL/L (ref 134–144)
TRIGL SERPL-MCNC: 128 MG/DL (ref 0–149)

## 2021-12-02 ENCOUNTER — TELEPHONE (OUTPATIENT)
Dept: FAMILY MEDICINE CLINIC | Facility: CLINIC | Age: 51
End: 2021-12-02

## 2022-01-10 DIAGNOSIS — Z13.6 SCREENING FOR CARDIOVASCULAR, RESPIRATORY, AND GENITOURINARY DISEASES: Primary | ICD-10-CM

## 2022-01-10 DIAGNOSIS — Z13.89 SCREENING FOR CARDIOVASCULAR, RESPIRATORY, AND GENITOURINARY DISEASES: Primary | ICD-10-CM

## 2022-01-10 DIAGNOSIS — Z13.83 SCREENING FOR CARDIOVASCULAR, RESPIRATORY, AND GENITOURINARY DISEASES: Primary | ICD-10-CM

## 2022-01-10 DIAGNOSIS — Z12.5 PROSTATE CANCER SCREENING: ICD-10-CM

## 2022-01-10 DIAGNOSIS — Z13.1 SCREENING FOR DIABETES MELLITUS (DM): ICD-10-CM

## 2022-07-12 LAB
ALBUMIN SERPL-MCNC: 4.6 G/DL (ref 3.8–4.9)
ALBUMIN/GLOB SERPL: 2.1 {RATIO} (ref 1.2–2.2)
ALP SERPL-CCNC: 52 IU/L (ref 44–121)
ALT SERPL-CCNC: 18 IU/L (ref 0–44)
AST SERPL-CCNC: 25 IU/L (ref 0–40)
BILIRUB SERPL-MCNC: 0.5 MG/DL (ref 0–1.2)
BUN SERPL-MCNC: 16 MG/DL (ref 6–24)
BUN/CREAT SERPL: 17 (ref 9–20)
CALCIUM SERPL-MCNC: 9.2 MG/DL (ref 8.7–10.2)
CHLORIDE SERPL-SCNC: 105 MMOL/L (ref 96–106)
CHOLEST SERPL-MCNC: 177 MG/DL (ref 100–199)
CO2 SERPL-SCNC: 25 MMOL/L (ref 20–29)
CREAT SERPL-MCNC: 0.94 MG/DL (ref 0.76–1.27)
EGFR: 98 ML/MIN/1.73
GLOBULIN SER-MCNC: 2.2 G/DL (ref 1.5–4.5)
GLUCOSE SERPL-MCNC: 83 MG/DL (ref 65–99)
HDLC SERPL-MCNC: 44 MG/DL
LDLC SERPL CALC-MCNC: 107 MG/DL (ref 0–99)
MICRODELETION SYND BLD/T FISH: NORMAL
POTASSIUM SERPL-SCNC: 4.2 MMOL/L (ref 3.5–5.2)
PROT SERPL-MCNC: 6.8 G/DL (ref 6–8.5)
PSA SERPL-MCNC: 1.2 NG/ML (ref 0–4)
SODIUM SERPL-SCNC: 143 MMOL/L (ref 134–144)
TRIGL SERPL-MCNC: 144 MG/DL (ref 0–149)

## 2023-01-12 DIAGNOSIS — Z13.1 SCREENING FOR DIABETES MELLITUS (DM): ICD-10-CM

## 2023-01-12 DIAGNOSIS — Z13.89 SCREENING FOR CARDIOVASCULAR, RESPIRATORY, AND GENITOURINARY DISEASES: Primary | ICD-10-CM

## 2023-01-12 DIAGNOSIS — Z13.6 SCREENING FOR CARDIOVASCULAR, RESPIRATORY, AND GENITOURINARY DISEASES: Primary | ICD-10-CM

## 2023-01-12 DIAGNOSIS — Z12.5 PROSTATE CANCER SCREENING: ICD-10-CM

## 2023-01-12 DIAGNOSIS — Z13.83 SCREENING FOR CARDIOVASCULAR, RESPIRATORY, AND GENITOURINARY DISEASES: Primary | ICD-10-CM

## 2023-04-06 DIAGNOSIS — Z12.11 COLON CANCER SCREENING: Primary | ICD-10-CM

## 2023-07-12 ENCOUNTER — PREP FOR PROCEDURE (OUTPATIENT)
Dept: SURGERY | Facility: CLINIC | Age: 53
End: 2023-07-12

## 2023-07-12 ENCOUNTER — OFFICE VISIT (OUTPATIENT)
Dept: SURGERY | Facility: CLINIC | Age: 53
End: 2023-07-12
Payer: COMMERCIAL

## 2023-07-12 ENCOUNTER — ANESTHESIA EVENT (OUTPATIENT)
Dept: PERIOP | Facility: HOSPITAL | Age: 53
End: 2023-07-12
Payer: COMMERCIAL

## 2023-07-12 VITALS
WEIGHT: 185 LBS | HEIGHT: 69 IN | DIASTOLIC BLOOD PRESSURE: 83 MMHG | SYSTOLIC BLOOD PRESSURE: 145 MMHG | BODY MASS INDEX: 27.4 KG/M2 | HEART RATE: 84 BPM

## 2023-07-12 DIAGNOSIS — K42.9 UMBILICAL HERNIA WITHOUT OBSTRUCTION AND WITHOUT GANGRENE: Primary | ICD-10-CM

## 2023-07-12 DIAGNOSIS — K42.9 UMBILICAL HERNIA: Primary | ICD-10-CM

## 2023-07-12 LAB
ALBUMIN SERPL-MCNC: 4.8 G/DL (ref 3.8–4.9)
ALBUMIN/GLOB SERPL: 1.9 {RATIO} (ref 1.2–2.2)
ALP SERPL-CCNC: 55 IU/L (ref 44–121)
ALT SERPL-CCNC: 25 IU/L (ref 0–44)
AST SERPL-CCNC: 27 IU/L (ref 0–40)
BILIRUB SERPL-MCNC: 0.6 MG/DL (ref 0–1.2)
BUN SERPL-MCNC: 19 MG/DL (ref 6–24)
BUN/CREAT SERPL: 18 (ref 9–20)
CALCIUM SERPL-MCNC: 9.6 MG/DL (ref 8.7–10.2)
CHLORIDE SERPL-SCNC: 102 MMOL/L (ref 96–106)
CHOLEST SERPL-MCNC: 192 MG/DL (ref 100–199)
CO2 SERPL-SCNC: 25 MMOL/L (ref 20–29)
CREAT SERPL-MCNC: 1.06 MG/DL (ref 0.76–1.27)
EGFR: 84 ML/MIN/1.73
GLOBULIN SER-MCNC: 2.5 G/DL (ref 1.5–4.5)
GLUCOSE SERPL-MCNC: 85 MG/DL (ref 70–99)
HDLC SERPL-MCNC: 51 MG/DL
LDLC SERPL CALC-MCNC: 116 MG/DL (ref 0–99)
LDLC/HDLC SERPL: 2.3 RATIO (ref 0–3.6)
MICRODELETION SYND BLD/T FISH: NORMAL
POTASSIUM SERPL-SCNC: 4.3 MMOL/L (ref 3.5–5.2)
PROT SERPL-MCNC: 7.3 G/DL (ref 6–8.5)
PSA SERPL DL<=0.01 NG/ML-MCNC: 1.36 NG/ML (ref 0–4)
SL AMB VLDL CHOLESTEROL CALC: 25 MG/DL (ref 5–40)
SODIUM SERPL-SCNC: 142 MMOL/L (ref 134–144)
TRIGL SERPL-MCNC: 144 MG/DL (ref 0–149)

## 2023-07-12 PROCEDURE — 99204 OFFICE O/P NEW MOD 45 MIN: CPT | Performed by: SURGERY

## 2023-07-12 NOTE — LETTER
July 12, 2023     [place], [address] [facility] [address]    Patient: Karene Bumpers   YOB: 1970   Date of Visit: 7/12/2023       Dear Dr. Woodrow Mejia: Thank you for referring Karene Bumpers to me for evaluation. Below are my notes for this consultation. If you have questions, please do not hesitate to call me. I look forward to following your patient along with you. Sincerely,        Joselo Lang DO        CC: No Recipients    Joselo mireles DO  7/12/2023 10:10 AM  Sign when Signing Visit  Assessment/Plan:    Diagnoses and all orders for this visit:    Umbilical hernia without obstruction and without gangrene    Risks and benefits of an umbilical hernia repair were discussed with Chelsie Maguire and his wife including the potential for recurrence or bowel injury and he agrees to proceed. It appears I have an opening on 7/13/2023 and he is agreeable. Recent CMP is unremarkable. We will obtain an EKG preop    Subjective:     Patient ID: Karene Bumpers is a 47 y.o. male. Patient presents for umbilical hernia consult. States he has had a bulge at his umbilicus for years. He has had "cramping" pain x3 in the past 2 weeks. Limits his activities. Has had more discomfort over this area. No abdominal surgery in the past.  He is becoming more concerned given his discomfort. Denies nausea or vomiting. In general his health is quite good. The following portions of the patient's history were reviewed and updated as appropriate:     He  has no past medical history on file. He  has no past surgical history on file. His family history includes Diabetes in his paternal grandmother; Hypertension in his mother; Other in his father. He  reports that he has quit smoking. He has never used smokeless tobacco. He reports current alcohol use. No history on file for drug use.   Current Outpatient Medications   Medication Sig Dispense Refill   • Ascorbic Acid (VITAMIN C) 1000 MG tablet Take 1,000 mg by mouth daily     • Cholecalciferol ([employer]S VITAMIN D PO) Take by mouth daily     • Multiple Vitamins-Minerals (DAILY COMBO MULTI VITAMINS) TABS Take 1 tablet by mouth daily     • Turmeric 400 MG CAPS Take by mouth       No current facility-administered medications for this visit. He is allergic to penicillins. .    Review of Systems   Gastrointestinal: Positive for abdominal pain. All other systems reviewed and are negative. Objective:      /83   Pulse 84   Ht 5' 9" (1.753 m)   Wt 83.9 kg (185 lb)   BMI 27.32 kg/m²         Physical Exam  Constitutional:       General: He is not in acute distress. HENT:      Head: Atraumatic. Mouth/Throat:      Mouth: Mucous membranes are moist.   Eyes:      Extraocular Movements: Extraocular movements intact. Cardiovascular:      Rate and Rhythm: Normal rate. Pulmonary:      Effort: Pulmonary effort is normal.   Abdominal:      General: Abdomen is flat. Bowel sounds are normal.      Hernia: A hernia (Soft reducible supraumbilical hernia. No skin changes) is present. Musculoskeletal:      Cervical back: Normal range of motion and neck supple. Skin:     General: Skin is warm and dry. Neurological:      Mental Status: He is alert.       Extremity: No edema

## 2023-07-12 NOTE — PROGRESS NOTES
Assessment/Plan:    Diagnoses and all orders for this visit:    Umbilical hernia without obstruction and without gangrene    Risks and benefits of an umbilical hernia repair were discussed with Villa Wasserman and his wife including the potential for recurrence or bowel injury and he agrees to proceed. It appears I have an opening on 7/13/2023 and he is agreeable. Recent CMP is unremarkable. We will obtain an EKG preop    Subjective:      Patient ID: Dioni Magana is a 47 y.o. male. Patient presents for umbilical hernia consult. States he has had a bulge at his umbilicus for years. He has had "cramping" pain x3 in the past 2 weeks. Limits his activities. Has had more discomfort over this area. No abdominal surgery in the past.  He is becoming more concerned given his discomfort. Denies nausea or vomiting. In general his health is quite good. The following portions of the patient's history were reviewed and updated as appropriate:     He  has no past medical history on file. He  has no past surgical history on file. His family history includes Diabetes in his paternal grandmother; Hypertension in his mother; Other in his father. He  reports that he has quit smoking. He has never used smokeless tobacco. He reports current alcohol use. No history on file for drug use. Current Outpatient Medications   Medication Sig Dispense Refill   • Ascorbic Acid (VITAMIN C) 1000 MG tablet Take 1,000 mg by mouth daily     • Cholecalciferol (CVS VITAMIN D PO) Take by mouth daily     • Multiple Vitamins-Minerals (DAILY COMBO MULTI VITAMINS) TABS Take 1 tablet by mouth daily     • Turmeric 400 MG CAPS Take by mouth       No current facility-administered medications for this visit. He is allergic to penicillins. .    Review of Systems   Gastrointestinal: Positive for abdominal pain. All other systems reviewed and are negative.         Objective:      /83   Pulse 84   Ht 5' 9" (1.753 m)   Wt 83.9 kg (185 lb) BMI 27.32 kg/m²          Physical Exam  Constitutional:       General: He is not in acute distress. HENT:      Head: Atraumatic. Mouth/Throat:      Mouth: Mucous membranes are moist.   Eyes:      Extraocular Movements: Extraocular movements intact. Cardiovascular:      Rate and Rhythm: Normal rate. Pulmonary:      Effort: Pulmonary effort is normal.   Abdominal:      General: Abdomen is flat. Bowel sounds are normal.      Hernia: A hernia (Soft reducible supraumbilical hernia. No skin changes) is present. Musculoskeletal:      Cervical back: Normal range of motion and neck supple. Skin:     General: Skin is warm and dry. Neurological:      Mental Status: He is alert.        Extremity: No edema

## 2023-07-12 NOTE — H&P (VIEW-ONLY)
Assessment/Plan:    Diagnoses and all orders for this visit:    Umbilical hernia without obstruction and without gangrene    Risks and benefits of an umbilical hernia repair were discussed with Vini Gordon and his wife including the potential for recurrence or bowel injury and he agrees to proceed. It appears I have an opening on 7/13/2023 and he is agreeable. Recent CMP is unremarkable. We will obtain an EKG preop    Subjective:      Patient ID: Srini Maciel is a 47 y.o. male. Patient presents for umbilical hernia consult. States he has had a bulge at his umbilicus for years. He has had "cramping" pain x3 in the past 2 weeks. Limits his activities. Has had more discomfort over this area. No abdominal surgery in the past.  He is becoming more concerned given his discomfort. Denies nausea or vomiting. In general his health is quite good. The following portions of the patient's history were reviewed and updated as appropriate:     He  has no past medical history on file. He  has no past surgical history on file. His family history includes Diabetes in his paternal grandmother; Hypertension in his mother; Other in his father. He  reports that he has quit smoking. He has never used smokeless tobacco. He reports current alcohol use. No history on file for drug use. Current Outpatient Medications   Medication Sig Dispense Refill   • Ascorbic Acid (VITAMIN C) 1000 MG tablet Take 1,000 mg by mouth daily     • Cholecalciferol (CVS VITAMIN D PO) Take by mouth daily     • Multiple Vitamins-Minerals (DAILY COMBO MULTI VITAMINS) TABS Take 1 tablet by mouth daily     • Turmeric 400 MG CAPS Take by mouth       No current facility-administered medications for this visit. He is allergic to penicillins. .    Review of Systems   Gastrointestinal: Positive for abdominal pain. All other systems reviewed and are negative.         Objective:      /83   Pulse 84   Ht 5' 9" (1.753 m)   Wt 83.9 kg (185 lb) BMI 27.32 kg/m²          Physical Exam  Constitutional:       General: He is not in acute distress. HENT:      Head: Atraumatic. Mouth/Throat:      Mouth: Mucous membranes are moist.   Eyes:      Extraocular Movements: Extraocular movements intact. Cardiovascular:      Rate and Rhythm: Normal rate. Pulmonary:      Effort: Pulmonary effort is normal.   Abdominal:      General: Abdomen is flat. Bowel sounds are normal.      Hernia: A hernia (Soft reducible supraumbilical hernia. No skin changes) is present. Musculoskeletal:      Cervical back: Normal range of motion and neck supple. Skin:     General: Skin is warm and dry. Neurological:      Mental Status: He is alert.        Extremity: No edema

## 2023-07-13 ENCOUNTER — HOSPITAL ENCOUNTER (OUTPATIENT)
Facility: HOSPITAL | Age: 53
Setting detail: OUTPATIENT SURGERY
Discharge: HOME | End: 2023-07-13
Attending: SURGERY | Admitting: SURGERY
Payer: COMMERCIAL

## 2023-07-13 ENCOUNTER — ANESTHESIA (OUTPATIENT)
Dept: PERIOP | Facility: HOSPITAL | Age: 53
End: 2023-07-13
Payer: COMMERCIAL

## 2023-07-13 VITALS
HEIGHT: 69 IN | OXYGEN SATURATION: 98 % | BODY MASS INDEX: 27.4 KG/M2 | SYSTOLIC BLOOD PRESSURE: 142 MMHG | DIASTOLIC BLOOD PRESSURE: 86 MMHG | TEMPERATURE: 97.6 F | RESPIRATION RATE: 15 BRPM | WEIGHT: 185 LBS | HEART RATE: 70 BPM

## 2023-07-13 DIAGNOSIS — K42.9 UMBILICAL HERNIA WITHOUT OBSTRUCTION AND WITHOUT GANGRENE: Primary | ICD-10-CM

## 2023-07-13 PROCEDURE — 93005 ELECTROCARDIOGRAM TRACING: CPT

## 2023-07-13 PROCEDURE — 49591 RPR AA HRN 1ST < 3 CM RDC: CPT | Performed by: SURGERY

## 2023-07-13 PROCEDURE — C1781 MESH (IMPLANTABLE): HCPCS | Performed by: SURGERY

## 2023-07-13 DEVICE — VENTRALEX ST HERNIA PATCH
Type: IMPLANTABLE DEVICE | Site: UMBILICAL | Status: FUNCTIONAL
Brand: VENTRALEX ST HERNIA PATCH

## 2023-07-13 RX ORDER — OXYCODONE HYDROCHLORIDE 5 MG/1
5 TABLET ORAL EVERY 4 HOURS PRN
Status: DISCONTINUED | OUTPATIENT
Start: 2023-07-13 | End: 2023-07-13 | Stop reason: HOSPADM

## 2023-07-13 RX ORDER — MIDAZOLAM HYDROCHLORIDE 2 MG/2ML
INJECTION, SOLUTION INTRAMUSCULAR; INTRAVENOUS AS NEEDED
Status: DISCONTINUED | OUTPATIENT
Start: 2023-07-13 | End: 2023-07-13

## 2023-07-13 RX ORDER — ONDANSETRON 2 MG/ML
4 INJECTION INTRAMUSCULAR; INTRAVENOUS EVERY 4 HOURS PRN
Status: DISCONTINUED | OUTPATIENT
Start: 2023-07-13 | End: 2023-07-13 | Stop reason: HOSPADM

## 2023-07-13 RX ORDER — SODIUM CHLORIDE, SODIUM LACTATE, POTASSIUM CHLORIDE, CALCIUM CHLORIDE 600; 310; 30; 20 MG/100ML; MG/100ML; MG/100ML; MG/100ML
125 INJECTION, SOLUTION INTRAVENOUS CONTINUOUS
Status: DISCONTINUED | OUTPATIENT
Start: 2023-07-13 | End: 2023-07-13 | Stop reason: HOSPADM

## 2023-07-13 RX ORDER — LIDOCAINE HYDROCHLORIDE 20 MG/ML
INJECTION, SOLUTION EPIDURAL; INFILTRATION; INTRACAUDAL; PERINEURAL AS NEEDED
Status: DISCONTINUED | OUTPATIENT
Start: 2023-07-13 | End: 2023-07-13

## 2023-07-13 RX ORDER — ONDANSETRON 2 MG/ML
4 INJECTION INTRAMUSCULAR; INTRAVENOUS ONCE AS NEEDED
Status: DISCONTINUED | OUTPATIENT
Start: 2023-07-13 | End: 2023-07-13 | Stop reason: HOSPADM

## 2023-07-13 RX ORDER — MAGNESIUM HYDROXIDE 1200 MG/15ML
LIQUID ORAL AS NEEDED
Status: DISCONTINUED | OUTPATIENT
Start: 2023-07-13 | End: 2023-07-13 | Stop reason: HOSPADM

## 2023-07-13 RX ORDER — OXYCODONE HYDROCHLORIDE 5 MG/1
5 TABLET ORAL EVERY 4 HOURS PRN
Qty: 10 TABLET | Refills: 0 | Status: SHIPPED | OUTPATIENT
Start: 2023-07-13 | End: 2023-07-23

## 2023-07-13 RX ORDER — ACETAMINOPHEN 325 MG/1
975 TABLET ORAL ONCE
Status: COMPLETED | OUTPATIENT
Start: 2023-07-13 | End: 2023-07-13

## 2023-07-13 RX ORDER — DEXAMETHASONE SODIUM PHOSPHATE 10 MG/ML
INJECTION, SOLUTION INTRAMUSCULAR; INTRAVENOUS AS NEEDED
Status: DISCONTINUED | OUTPATIENT
Start: 2023-07-13 | End: 2023-07-13

## 2023-07-13 RX ORDER — FENTANYL CITRATE 50 UG/ML
INJECTION, SOLUTION INTRAMUSCULAR; INTRAVENOUS AS NEEDED
Status: DISCONTINUED | OUTPATIENT
Start: 2023-07-13 | End: 2023-07-13

## 2023-07-13 RX ORDER — HYDROMORPHONE HCL/PF 1 MG/ML
0.5 SYRINGE (ML) INJECTION
Status: DISCONTINUED | OUTPATIENT
Start: 2023-07-13 | End: 2023-07-13 | Stop reason: HOSPADM

## 2023-07-13 RX ORDER — FENTANYL CITRATE/PF 50 MCG/ML
25 SYRINGE (ML) INJECTION
Status: DISCONTINUED | OUTPATIENT
Start: 2023-07-13 | End: 2023-07-13 | Stop reason: HOSPADM

## 2023-07-13 RX ORDER — CEFAZOLIN SODIUM 2 G/50ML
2000 SOLUTION INTRAVENOUS ONCE
Status: COMPLETED | OUTPATIENT
Start: 2023-07-13 | End: 2023-07-13

## 2023-07-13 RX ORDER — KETOROLAC TROMETHAMINE 30 MG/ML
INJECTION, SOLUTION INTRAMUSCULAR; INTRAVENOUS AS NEEDED
Status: DISCONTINUED | OUTPATIENT
Start: 2023-07-13 | End: 2023-07-13

## 2023-07-13 RX ORDER — BUPIVACAINE HYDROCHLORIDE AND EPINEPHRINE 2.5; 5 MG/ML; UG/ML
INJECTION, SOLUTION EPIDURAL; INFILTRATION; INTRACAUDAL; PERINEURAL AS NEEDED
Status: DISCONTINUED | OUTPATIENT
Start: 2023-07-13 | End: 2023-07-13 | Stop reason: HOSPADM

## 2023-07-13 RX ORDER — PROPOFOL 10 MG/ML
INJECTION, EMULSION INTRAVENOUS AS NEEDED
Status: DISCONTINUED | OUTPATIENT
Start: 2023-07-13 | End: 2023-07-13

## 2023-07-13 RX ADMIN — DEXAMETHASONE SODIUM PHOSPHATE 8 MG: 10 INJECTION, SOLUTION INTRAMUSCULAR; INTRAVENOUS at 14:30

## 2023-07-13 RX ADMIN — ACETAMINOPHEN 975 MG: 325 TABLET, FILM COATED ORAL at 16:40

## 2023-07-13 RX ADMIN — SODIUM CHLORIDE, SODIUM LACTATE, POTASSIUM CHLORIDE, AND CALCIUM CHLORIDE: .6; .31; .03; .02 INJECTION, SOLUTION INTRAVENOUS at 14:08

## 2023-07-13 RX ADMIN — FENTANYL CITRATE 50 MCG: 50 INJECTION INTRAMUSCULAR; INTRAVENOUS at 14:42

## 2023-07-13 RX ADMIN — SODIUM CHLORIDE, SODIUM LACTATE, POTASSIUM CHLORIDE, AND CALCIUM CHLORIDE: .6; .31; .03; .02 INJECTION, SOLUTION INTRAVENOUS at 14:47

## 2023-07-13 RX ADMIN — CEFAZOLIN SODIUM 2000 MG: 2 SOLUTION INTRAVENOUS at 14:28

## 2023-07-13 RX ADMIN — PROPOFOL 250 MG: 10 INJECTION, EMULSION INTRAVENOUS at 14:30

## 2023-07-13 RX ADMIN — LIDOCAINE HYDROCHLORIDE 100 MG: 20 INJECTION, SOLUTION EPIDURAL; INFILTRATION; INTRACAUDAL; PERINEURAL at 14:30

## 2023-07-13 RX ADMIN — MIDAZOLAM 2 MG: 1 INJECTION INTRAMUSCULAR; INTRAVENOUS at 14:28

## 2023-07-13 RX ADMIN — KETOROLAC TROMETHAMINE 30 MG: 30 INJECTION, SOLUTION INTRAMUSCULAR; INTRAVENOUS at 14:51

## 2023-07-13 NOTE — DISCHARGE INSTR - AVS FIRST PAGE
Please call the office when you leave to schedule an appointment to be seen in 2-3 weeks    Activity:    Do not lift more than 25 pounds fir 4 weeks post-operatively                 Walking is encouraged  Normal daily activities including climbing steps are okay  Do not engage in strenuous activity or contact sports for 4-6 weeks post-operatively    Return to work:   Return to work to be discussed at first post-operative visit    Diet:   You may return to your normal heart healthy diet    Wound Care: Your wound is closed with glue  It is okay to shower. Wash incision gently with soap and water and pat dry. Do not soak incisions in bath water or swim for two weeks. Do not apply any creams or ointments. Ice as needed to wound    Pain Medication:   Please take as directed  No driving while taking narcotic pain medications    Other:  If you have questions after discharge please call the office.     If you have increased pain, fever >101.5, increased drainage, redness or a bad smell at your surgery site, please call us immediately or come directly to the Emergency Room

## 2023-07-13 NOTE — INTERVAL H&P NOTE
H&P reviewed. After examining the patient I find no changes in the patients condition since the H&P had been written.     Vitals:    07/13/23 1342   BP: 134/79   Pulse: 74   Resp: 16   Temp: 97.5 °F (36.4 °C)   SpO2: 96%

## 2023-07-13 NOTE — ANESTHESIA POSTPROCEDURE EVALUATION
Post-Op Assessment Note    CV Status:  Stable  Pain Score: 0    Pain management: adequate     Mental Status:  Alert and awake   Hydration Status:  Euvolemic   PONV Controlled:  Controlled   Airway Patency:  Patent      Post Op Vitals Reviewed: Yes      Staff: Anesthesiologist         No notable events documented.     BP   101/59   Temp 97.1   Pulse  74   Resp 12   SpO2   93

## 2023-07-13 NOTE — OP NOTE
OPERATIVE REPORT  PATIENT NAME: Vance Arteaga    :  1970  MRN: [id number]  Pt Location: AN OR ROOM 01    SURGERY DATE: 2023    Surgeon(s) and Role:     * Wilver Allan,  - Primary     * Geovany Andrade DO - Assisting    Preop Diagnosis:  Umbilical hernia [Y36.4]  <3cm    Post-Op Diagnosis Codes:     * Umbilical hernia [J66.8]  <3cm    Procedure(s):  REPAIR HERNIA UMBILICAL with 1.7 inch Ventralex patch    Specimen(s):  * No specimens in log *    Estimated Blood Loss:   Minimal    Drains:  * No LDAs found *    Anesthesia Type:   General/local    Operative Indications:  Umbilical hernia [R49.5]      Operative Findings:  Umbilical hernia. ASA 2. Wound class I  Height 69 inches weight 84 kg / 185 pounds. BMI 27    Complications:   None    Procedure and Technique:  Patient was brought to the operative suite and identified by visualization, conversation, by armband. Sequential compression pumps were placed. He was given Ancef perioperatively. Once under anesthesia abdomen was then prepped and draped in a sterile fashion. Timeout was performed and is assured that the prep was dry. Local was instilled about the umbilicus. Curvilinear skin incision was made at the supraumbilical fold. Underlying subcutaneous tissue was done with heart cautery till the hernia was noted this was freed up from the surrounding tissues as well as the underlying fascial defect. They placed a 4 x 4 sponge into the defect to help develop the preperitoneal space. A 1.7 inch Ventralex patch was chosen placed under the fascial defect. Tails were anchored to the superior and inferior edges with 0 Vicryl suture. Tails were trimmed. Irrigations carried out. Fascia was closed on top of the mesh using 0 Vicryl in a figure-of-eight fashion x2. 3-0 Vicryl was used to close subcutaneous tissues. Local was instilled. More irrigations carried out. 4-0 Monocryl was used to close skin incision in a subicular fashion.   Wounds and washed and dried. Sterile skin glue was applied. He was awakened in the operating returned to the recovery area in stable condition having tolerated the procedure well. I was present for the entire procedure.     Patient Disposition:  PACU         SIGNATURE: Kasey Will DO  DATE: July 13, 2023  TIME: 2:57 PM

## 2023-07-13 NOTE — ANESTHESIA PREPROCEDURE EVALUATION
Procedure:  REPAIR HERNIA UMBILICAL (Abdomen)    Relevant Problems   No relevant active problems       Patient ID: Saúl Wilson is a 47 y.o. male.     Patient presents for umbilical hernia consult. States he has had a bulge at his umbilicus for years. He has had "cramping" pain x3 in the past 2 weeks. Limits his activities.     Has had more discomfort over this area. No abdominal surgery in the past.  He is becoming more concerned given his discomfort. Denies nausea or vomiting. In general his health is quite good. Physical Exam    Airway    Mallampati score: III  TM Distance: >3 FB  Neck ROM: full     Dental       Cardiovascular  Cardiovascular exam normal    Pulmonary  Pulmonary exam normal     Other Findings       Carbuncle of axilla   Healthcare maintenance   Overweight   Umbilical hernia without obstruction and without gangrene         Anesthesia Plan  ASA Score- 2     Anesthesia Type- general with ASA Monitors. Additional Monitors:   Airway Plan: LMA. Plan Factors-Exercise tolerance (METS): >4 METS. Chart reviewed. Existing labs reviewed. Patient summary reviewed. Patient is not a current smoker. Induction- intravenous. Postoperative Plan- Plan for postoperative opioid use. Planned trial extubation    Informed Consent- Anesthetic plan and risks discussed with patient. I personally reviewed this patient with the CRNA. Discussed and agreed on the Anesthesia Plan with the CRNA. Theodor Points

## 2023-07-14 LAB
ATRIAL RATE: 80 BPM
P AXIS: 72 DEGREES
PR INTERVAL: 176 MS
QRS AXIS: 92 DEGREES
QRSD INTERVAL: 104 MS
QT INTERVAL: 378 MS
QTC INTERVAL: 435 MS
T WAVE AXIS: 48 DEGREES
VENTRICULAR RATE: 80 BPM

## 2023-07-14 PROCEDURE — 93010 ELECTROCARDIOGRAM REPORT: CPT | Performed by: INTERNAL MEDICINE

## 2023-07-26 ENCOUNTER — OFFICE VISIT (OUTPATIENT)
Dept: SURGERY | Facility: CLINIC | Age: 53
End: 2023-07-26
Payer: COMMERCIAL

## 2023-07-26 DIAGNOSIS — K42.9 UMBILICAL HERNIA WITHOUT OBSTRUCTION AND WITHOUT GANGRENE: Primary | ICD-10-CM

## 2023-07-26 PROCEDURE — 99212 OFFICE O/P EST SF 10 MIN: CPT | Performed by: SURGERY

## 2023-07-26 NOTE — LETTER
July 26, 2023     Sartahk Peterson DO  [address] [facility] [address]    Patient: Caryle Blinks   YOB: 1970   Date of Visit: 7/26/2023       Dear Dr. Carmela Fraire: Thank you for referring Caryle Blinks to me for evaluation. Below are my notes for this consultation. If you have questions, please do not hesitate to call me. I look forward to following your patient along with you. Sincerely,        Juanjo Borjas DO Precious        CC: No Recipients    Juanjo Borjas Jorge Lbia mirelesDO  7/26/2023  9:07 AM  Sign when Signing Visit  Assessment/Plan:    Diagnoses and all orders for this visit:    Umbilical hernia without obstruction and without gangrene    Status postumbilical hernia repair. Doing quite well. May resume unrestricted activity as of August 13    Subjective:     Patient ID: Caryle Blinks is a 47 y.o. male. Patient presents post operatively. Umbilical hernia repair 1/94/8192. The following portions of the patient's history were reviewed and updated as appropriate:     He  has no past medical history on file. He  has a past surgical history that includes pr rpr aa hernia 1st < 3 cm reducible (N/A, 07/13/2023) and Hernia repair (7/13/2023). His family history includes Diabetes in his paternal grandmother; Hypertension in his mother; Other in his father. He  reports that he has quit smoking. His smoking use included cigarettes. He has quit using smokeless tobacco.  His smokeless tobacco use included snuff and chew. He reports current alcohol use. He reports that he does not use drugs. Current Outpatient Medications   Medication Sig Dispense Refill   • Cholecalciferol (CVS VITAMIN D PO) Take by mouth daily     • Multiple Vitamins-Minerals (DAILY COMBO MULTI VITAMINS) TABS Take 1 tablet by mouth daily       No current facility-administered medications for this visit. He is allergic to penicillins. .    Review of Systems   Gastrointestinal: Positive for abdominal pain.   All other systems reviewed and are negative. Objective: There were no vitals taken for this visit. Physical Exam  Abdominal:      General: Abdomen is flat. Palpations: Abdomen is soft. Comments: Well-healed supraumbilical scar. Healing ridge as expected.   No signs of hernia recurrence

## 2023-07-26 NOTE — PROGRESS NOTES
Assessment/Plan:    Diagnoses and all orders for this visit:    Umbilical hernia without obstruction and without gangrene    Status postumbilical hernia repair. Doing quite well. May resume unrestricted activity as of August 13    Subjective:      Patient ID: Juanpablo Elizalde is a 47 y.o. male. Patient presents post operatively. Umbilical hernia repair 5/21/2558. The following portions of the patient's history were reviewed and updated as appropriate:     He  has no past medical history on file. He  has a past surgical history that includes pr rpr aa hernia 1st < 3 cm reducible (N/A, 07/13/2023) and Hernia repair (7/13/2023). His family history includes Diabetes in his paternal grandmother; Hypertension in his mother; Other in his father. He  reports that he has quit smoking. His smoking use included cigarettes. He has quit using smokeless tobacco.  His smokeless tobacco use included snuff and chew. He reports current alcohol use. He reports that he does not use drugs. Current Outpatient Medications   Medication Sig Dispense Refill   • Cholecalciferol (CVS VITAMIN D PO) Take by mouth daily     • Multiple Vitamins-Minerals (DAILY COMBO MULTI VITAMINS) TABS Take 1 tablet by mouth daily       No current facility-administered medications for this visit. He is allergic to penicillins. .    Review of Systems   Gastrointestinal: Positive for abdominal pain. All other systems reviewed and are negative. Objective: There were no vitals taken for this visit. Physical Exam  Abdominal:      General: Abdomen is flat. Palpations: Abdomen is soft. Comments: Well-healed supraumbilical scar. Healing ridge as expected.   No signs of hernia recurrence

## 2023-08-06 PROBLEM — L02.439 CARBUNCLE OF AXILLA: Status: RESOLVED | Noted: 2020-01-10 | Resolved: 2023-08-06

## 2024-01-23 ENCOUNTER — TELEPHONE (OUTPATIENT)
Dept: FAMILY MEDICINE CLINIC | Facility: CLINIC | Age: 54
End: 2024-01-23

## 2024-01-23 NOTE — TELEPHONE ENCOUNTER
Patients wife is requesting a blood work order. My chart message sent stating:  Can you send paperwork for Xavier & I to get our blood work done later this year in the mail like you usually do please. Lab Katerin  Xavier Lipid, CMP & PSA

## 2024-01-24 DIAGNOSIS — Z13.83 SCREENING FOR CARDIOVASCULAR, RESPIRATORY, AND GENITOURINARY DISEASES: ICD-10-CM

## 2024-01-24 DIAGNOSIS — Z12.5 PROSTATE CANCER SCREENING: Primary | ICD-10-CM

## 2024-01-24 DIAGNOSIS — Z13.6 SCREENING FOR CARDIOVASCULAR, RESPIRATORY, AND GENITOURINARY DISEASES: ICD-10-CM

## 2024-01-24 DIAGNOSIS — Z13.89 SCREENING FOR CARDIOVASCULAR, RESPIRATORY, AND GENITOURINARY DISEASES: ICD-10-CM

## 2024-01-24 DIAGNOSIS — Z13.1 SCREENING FOR DIABETES MELLITUS (DM): ICD-10-CM

## 2024-01-30 ENCOUNTER — TELEPHONE (OUTPATIENT)
Dept: FAMILY MEDICINE CLINIC | Facility: CLINIC | Age: 54
End: 2024-01-30

## 2024-01-30 NOTE — TELEPHONE ENCOUNTER
Regarding: FW: Right Knee Issue  Contact: [phone]    ----- Message -----  From: Joanie Tomas  Sent: 1/30/2024   2:46 PM EST  To: [facility]  Subject: Right Knee Issue                                 ----- Message from Joanie Tomas sent at 1/30/2024  2:46 PM EST -----       ----- Message from Xavier Avendano to Anant Zambrano DO sent at 1/30/2024  2:42 PM -----   Hi Dr. Zambrano,    I woke up this past Saturday with discomfort and stiffness in my right knee, there is fluid build-up behind and around the knee which makes it painful to flex.  It is not hot or painful to touch, just really stiff after sitting and usually loosens up when I stand for a bit.  It's feeling a little better today, I've been taking a low dose of ibuprofen and icing and elevating the last couple days.  I usually do calve stretches before bed, not sure if I pulled or tore something, but wanted to see what you thought.  I'm taking the watch and wait approach for now.    Best regards,  Xavier

## 2024-08-06 LAB
ALBUMIN SERPL-MCNC: 4.5 G/DL (ref 3.8–4.9)
ALP SERPL-CCNC: 56 IU/L (ref 44–121)
ALT SERPL-CCNC: 24 IU/L (ref 0–44)
AST SERPL-CCNC: 25 IU/L (ref 0–40)
BILIRUB SERPL-MCNC: 0.6 MG/DL (ref 0–1.2)
BUN SERPL-MCNC: 20 MG/DL (ref 6–24)
BUN/CREAT SERPL: 20 (ref 9–20)
CALCIUM SERPL-MCNC: 9.6 MG/DL (ref 8.7–10.2)
CHLORIDE SERPL-SCNC: 103 MMOL/L (ref 96–106)
CHOLEST SERPL-MCNC: 182 MG/DL (ref 100–199)
CO2 SERPL-SCNC: 24 MMOL/L (ref 20–29)
CREAT SERPL-MCNC: 1.01 MG/DL (ref 0.76–1.27)
EGFR: 89 ML/MIN/1.73
GLOBULIN SER-MCNC: 2.6 G/DL (ref 1.5–4.5)
GLUCOSE SERPL-MCNC: 89 MG/DL (ref 70–99)
HDLC SERPL-MCNC: 45 MG/DL
LDLC SERPL CALC-MCNC: 110 MG/DL (ref 0–99)
LDLC/HDLC SERPL: 2.4 RATIO (ref 0–3.6)
MICRODELETION SYND BLD/T FISH: NORMAL
POTASSIUM SERPL-SCNC: 4.5 MMOL/L (ref 3.5–5.2)
PROT SERPL-MCNC: 7.1 G/DL (ref 6–8.5)
PSA SERPL-MCNC: 3.7 NG/ML (ref 0–4)
SL AMB VLDL CHOLESTEROL CALC: 27 MG/DL (ref 5–40)
SODIUM SERPL-SCNC: 139 MMOL/L (ref 134–144)
TRIGL SERPL-MCNC: 154 MG/DL (ref 0–149)

## 2024-08-15 PROBLEM — R97.20 INCREASED PROSTATE SPECIFIC ANTIGEN (PSA) VELOCITY: Status: ACTIVE | Noted: 2024-08-15

## 2024-08-16 ENCOUNTER — OFFICE VISIT (OUTPATIENT)
Dept: FAMILY MEDICINE CLINIC | Facility: CLINIC | Age: 54
End: 2024-08-16
Payer: COMMERCIAL

## 2024-08-16 VITALS
BODY MASS INDEX: 28.52 KG/M2 | SYSTOLIC BLOOD PRESSURE: 134 MMHG | RESPIRATION RATE: 18 BRPM | DIASTOLIC BLOOD PRESSURE: 84 MMHG | TEMPERATURE: 98.6 F | WEIGHT: 188.2 LBS | HEART RATE: 75 BPM | HEIGHT: 68 IN

## 2024-08-16 DIAGNOSIS — Z23 IMMUNIZATION DUE: ICD-10-CM

## 2024-08-16 DIAGNOSIS — R97.20 INCREASED PROSTATE SPECIFIC ANTIGEN (PSA) VELOCITY: ICD-10-CM

## 2024-08-16 DIAGNOSIS — Z00.00 HEALTHCARE MAINTENANCE: Primary | ICD-10-CM

## 2024-08-16 PROCEDURE — 90471 IMMUNIZATION ADMIN: CPT

## 2024-08-16 PROCEDURE — 90715 TDAP VACCINE 7 YRS/> IM: CPT

## 2024-08-16 PROCEDURE — 99396 PREV VISIT EST AGE 40-64: CPT | Performed by: FAMILY MEDICINE

## 2024-08-16 NOTE — PROGRESS NOTES
Assessment/Plan:    No problem-specific Assessment & Plan notes found for this encounter.    Cpe  Last ov was 1/10/2020    PSA velocity increased, 2.4 rise in 1 year  Offered repeat vs urology evaluation    Armpit symptoms? Can f/u in future if wants evaluation, declined today due to CPE visit     Diagnoses and all orders for this visit:    Healthcare maintenance    Increased prostate specific antigen (PSA) velocity  -     Ambulatory referral to Urology; Future  -     PSA Total, Diagnostic; Future  -     PSA Total, Diagnostic    Immunization due  -     TDAP VACCINE GREATER THAN OR EQUAL TO 8YO IM    Other orders  -     Castor Oil (PURGE PO); Take by mouth in the morning Kidney health        No follow-ups on file.    Subjective:      Patient ID: Xavier Avendano is a 54 y.o. male.    Chief Complaint   Patient presents with    Annual Exam     Edna Henderson LPN         HPI  New job  IT security, different company  Missed last few CPE visits, last ov 2020    Eating ok, no restriction per pt  No added salt  Starting to cut carbs    Daily exercise  Weights and walking    32oz per day  Social etoh, about 2x/w, not in excess  No tob    Wt about same    The 10-year ASCVD risk score (Viktor PRICE, et al., 2019) is: 4.9%    Values used to calculate the score:      Age: 54 years      Sex: Male      Is Non- : No      Diabetic: No      Tobacco smoker: No      Systolic Blood Pressure: 134 mmHg      Is BP treated: No      HDL Cholesterol: 45 mg/dL      Total Cholesterol: 182 mg/dL    The following portions of the patient's history were reviewed and updated as appropriate: allergies, current medications, past family history, past medical history, past social history, past surgical history and problem list.    Review of Systems   Constitutional:  Negative for chills and fever.         Current Outpatient Medications   Medication Sig Dispense Refill    Castor Oil (PURGE PO) Take by mouth in the morning Kidney  "health      Multiple Vitamins-Minerals (DAILY COMBO MULTI VITAMINS) TABS Take 1 tablet by mouth daily       No current facility-administered medications for this visit.       Objective:    /84   Pulse 75   Temp 98.6 °F (37 °C)   Resp 18   Ht 5' 8\" (1.727 m)   Wt 85.4 kg (188 lb 3.2 oz)   BMI 28.62 kg/m²        Physical Exam  Vitals and nursing note reviewed.   Constitutional:       General: He is not in acute distress.     Appearance: He is well-developed. He is not ill-appearing.   HENT:      Head: Normocephalic.      Right Ear: Tympanic membrane and ear canal normal.      Left Ear: Tympanic membrane and ear canal normal.      Ears:      Comments: Increased cerumen b/l     Mouth/Throat:      Mouth: Mucous membranes are moist.   Eyes:      General: No scleral icterus.     Conjunctiva/sclera: Conjunctivae normal.   Neck:      Vascular: No carotid bruit.   Cardiovascular:      Rate and Rhythm: Normal rate and regular rhythm.      Heart sounds: No murmur heard.  Pulmonary:      Effort: Pulmonary effort is normal. No respiratory distress.      Breath sounds: No wheezing.   Abdominal:      General: There is no distension.      Palpations: Abdomen is soft.      Tenderness: There is no abdominal tenderness.      Hernia: No hernia is present.   Genitourinary:     Penis: Normal.       Testes: Normal.      Prostate: Normal.      Rectum: Normal.   Musculoskeletal:         General: No deformity.      Cervical back: Neck supple.      Right lower leg: No edema.      Left lower leg: No edema.   Skin:     General: Skin is warm and dry.      Coloration: Skin is not jaundiced or pale.   Neurological:      Mental Status: He is alert.      Motor: No weakness.      Gait: Gait normal.   Psychiatric:         Mood and Affect: Mood normal.         Behavior: Behavior normal.         Thought Content: Thought content normal.                Anant Zambrano, DO    "

## 2024-08-30 LAB — PSA SERPL-MCNC: 1.5 NG/ML (ref 0–4)

## 2024-11-01 ENCOUNTER — TELEPHONE (OUTPATIENT)
Age: 54
End: 2024-11-01

## 2024-11-01 NOTE — TELEPHONE ENCOUNTER
LMOM for patient to call back and schedule a appointment, Dr. Katz is scheduling out till nov/dec. Next avail. Is fine

## 2024-11-30 PROBLEM — R97.20 INCREASED PROSTATE SPECIFIC ANTIGEN (PSA) VELOCITY: Status: RESOLVED | Noted: 2024-08-15 | Resolved: 2024-11-30

## 2025-03-21 ENCOUNTER — VBI (OUTPATIENT)
Dept: ADMINISTRATIVE | Facility: OTHER | Age: 55
End: 2025-03-21

## 2025-03-21 NOTE — TELEPHONE ENCOUNTER
03/21/25 7:23 PM     Chart reviewed for Depression Screening was/were submitted to the patient's insurance.     Audra Dueñas MA   [facility]

## 2025-06-05 DIAGNOSIS — Z13.1 SCREENING FOR DIABETES MELLITUS (DM): ICD-10-CM

## 2025-06-05 DIAGNOSIS — Z13.89 SCREENING FOR CARDIOVASCULAR, RESPIRATORY, AND GENITOURINARY DISEASES: ICD-10-CM

## 2025-06-05 DIAGNOSIS — Z13.83 SCREENING FOR CARDIOVASCULAR, RESPIRATORY, AND GENITOURINARY DISEASES: ICD-10-CM

## 2025-06-05 DIAGNOSIS — Z13.6 SCREENING FOR CARDIOVASCULAR, RESPIRATORY, AND GENITOURINARY DISEASES: ICD-10-CM

## 2025-06-05 DIAGNOSIS — Z12.5 ENCOUNTER FOR SCREENING FOR MALIGNANT NEOPLASM OF PROSTATE: ICD-10-CM

## 2025-06-05 DIAGNOSIS — N42.9 DISORDER OF PROSTATE, UNSPECIFIED: ICD-10-CM

## 2025-06-24 ENCOUNTER — TELEPHONE (OUTPATIENT)
Dept: SURGERY | Facility: CLINIC | Age: 55
End: 2025-06-24

## 2025-06-24 NOTE — TELEPHONE ENCOUNTER
Left message for rigo to call the office.  He wanted a sooner appt with Dr Lang.  There is an appt available for 6/25 @ 9:45.  Can schedule or transfer the call to the office.

## 2025-06-25 ENCOUNTER — CONSULT (OUTPATIENT)
Dept: SURGERY | Facility: CLINIC | Age: 55
End: 2025-06-25
Payer: COMMERCIAL

## 2025-06-25 VITALS — HEIGHT: 68 IN | BODY MASS INDEX: 27.28 KG/M2 | WEIGHT: 180 LBS

## 2025-06-25 DIAGNOSIS — L02.419 AXILLARY ABSCESS: Primary | ICD-10-CM

## 2025-06-25 DIAGNOSIS — L72.3 SEBACEOUS CYST: ICD-10-CM

## 2025-06-25 PROCEDURE — 10060 I&D ABSCESS SIMPLE/SINGLE: CPT | Performed by: SURGERY

## 2025-06-25 PROCEDURE — 99203 OFFICE O/P NEW LOW 30 MIN: CPT | Performed by: SURGERY

## 2025-06-25 NOTE — PROGRESS NOTES
"Name: Xavier Avendano      : 1970      MRN: [id number]  Encounter Provider: Brian Lang DO  Encounter Date: 2025   Encounter department: [facility] SURGERY [facility]  :  Assessment & Plan  Axillary abscess  Status post incision and drainage of left axillary abscess.  Appears to be a sebaceous cyst.  Some the capsule came out during the I&D process.  Packing was placed.  Wound care instructions given.  Will see back in 3 weeks for reassessment       Sebaceous cyst  Appears to be the etiology of the left axillary abscess.  Much of the cyst wall capsule removed during the I&D procedure.  Will see him back in 3 weeks to reassess           History of Present Illness   HPI  Xavier Avendano is a 55 y.o. male who presents for evaluation of a left axillary lump.  States he has had the lump for years.  The lump became red and sore one week ago.  Starting to drain a scant amount of purulence.  Denies fevers  History obtained from: patient    Review of Systems   All other systems reviewed and are negative.    Medical History Reviewed by provider this encounter:     .     Objective   Ht 5' 8\" (1.727 m)   Wt 81.6 kg (180 lb)   BMI 27.37 kg/m²      Physical Exam  Constitutional:       General: He is not in acute distress.    Skin:     General: Skin is warm and dry.      Comments: 2 cm left axillary abscess.  Discussed risks and benefits of an incision and drainage procedure and he agrees to proceed     Neurological:      Mental Status: He is alert.     Incision and Drainage    Date/Time: 2025 9:45 AM    Performed by: Brian Lang DO  Authorized by: Brian Lang DO    Universal Protocol:  Consent: Verbal consent obtained  Risks and benefits: risks, benefits and alternatives were discussed  Consent given by: patient  Time out: Immediately prior to procedure a \"time out\" was called to verify the correct patient, procedure, equipment, support staff and site/side marked as " required.  Patient understanding: patient states understanding of the procedure being performed  Patient identity confirmed: verbally with patient    Patient location:  Clinic  Location:     Type:  Abscess    Location: Left axilla.  Pre-procedure details:     Procedure prep: Alcohol.  Anesthesia (see MAR for exact dosages):     Anesthesia method:  Local infiltration    Local anesthetic:  Lidocaine 1% WITH epi  Procedure details:     Complexity:  Intermediate    Incision types:  Cruciate    Scalpel blade:  11    Incision depth:  Subcutaneous    Wound management:  Probed and deloculated    Drainage:  Bloody and purulent    Drainage amount:  Moderate    Wound treatment:  Wound left open    Packing materials:  1/2 in gauze  Post-procedure details:     Patient tolerance of procedure:  Tolerated well, no immediate complications  Comments:      Appears majority of the cyst capsule removed during the I&D procedure.

## 2025-06-25 NOTE — ASSESSMENT & PLAN NOTE
Status post incision and drainage of left axillary abscess.  Appears to be a sebaceous cyst.  Some the capsule came out during the I&D process.  Packing was placed.  Wound care instructions given.  Will see back in 3 weeks for reassessment

## 2025-07-16 ENCOUNTER — OFFICE VISIT (OUTPATIENT)
Dept: SURGERY | Facility: CLINIC | Age: 55
End: 2025-07-16
Payer: COMMERCIAL

## 2025-07-16 DIAGNOSIS — L02.419 AXILLARY ABSCESS: Primary | ICD-10-CM

## 2025-07-16 DIAGNOSIS — L72.3 SEBACEOUS CYST: ICD-10-CM

## 2025-07-16 PROCEDURE — 99212 OFFICE O/P EST SF 10 MIN: CPT | Performed by: SURGERY

## 2025-07-16 PROCEDURE — 10060 I&D ABSCESS SIMPLE/SINGLE: CPT | Performed by: SURGERY

## 2025-07-16 NOTE — PROGRESS NOTES
Name: Xavier Avendano      : 1970      MRN: [id number]  Encounter Provider: Brian Lang DO  Encounter Date: 2025   Encounter department: [facility] SURGERY [facility]  :  Assessment & Plan  Axillary abscess  Infection is completely cleared up.  Dramatic improvement after incision and drainage       Sebaceous cyst  Much of the cyst capsule came out during the I&D procedure.  He only feels to have some scant thickening from the I&D procedure.  Offered removal versus observation.  He opts for observation.           History of Present Illness   HPI  Xavier Avendano is a 55 y.o. male who presents for wound check.  S/P I&D left axillary abscess 2025.  Improved.  History obtained from: patient    Review of Systems   All other systems reviewed and are negative.    Medical History Reviewed by provider this encounter:     .     Objective   There were no vitals taken for this visit.     Physical Exam  Constitutional:       General: He is not in acute distress.    Skin:     General: Skin is warm and dry.      Comments: Well-healed I&D site.  2 mm separation with good granular tissue.  No signs of infection.     Neurological:      Mental Status: He is alert.

## (undated) DEVICE — DECANTER: Brand: UNBRANDED

## (undated) DEVICE — BETHLEHEM UNIVERSAL MINOR GEN: Brand: CARDINAL HEALTH

## (undated) DEVICE — INTENDED FOR TISSUE SEPARATION, AND OTHER PROCEDURES THAT REQUIRE A SHARP SURGICAL BLADE TO PUNCTURE OR CUT.: Brand: BARD-PARKER SAFETY BLADES SIZE 15, STERILE

## (undated) DEVICE — DRAPE EQUIPMENT RF WAND

## (undated) DEVICE — CHLORAPREP HI-LITE 26ML ORANGE

## (undated) DEVICE — ADHESIVE SKIN HIGH VISCOSITY EXOFIN 1ML

## (undated) DEVICE — PLUMEPEN PRO 10FT

## (undated) DEVICE — BULB SYRINGE,IRRIGATION WITH PROTECTIVE CAP: Brand: DOVER

## (undated) DEVICE — SUT MONOCRYL 4-0 PS-2 27 IN Y426H

## (undated) DEVICE — GLOVE SRG BIOGEL ORTHOPEDIC 8

## (undated) DEVICE — NEEDLE 22 G X 1 1/2 SAFETY

## (undated) DEVICE — TOWEL SURG XR DETECT GREEN STRL RFD

## (undated) DEVICE — PAD GROUNDING ADULT

## (undated) DEVICE — SUT VICRYL 0 UR-6 27 IN J603H